# Patient Record
Sex: FEMALE | Race: OTHER | NOT HISPANIC OR LATINO | URBAN - METROPOLITAN AREA
[De-identification: names, ages, dates, MRNs, and addresses within clinical notes are randomized per-mention and may not be internally consistent; named-entity substitution may affect disease eponyms.]

---
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
## 2022-05-25 NOTE — PRE-OP CHECKLIST - TO WHOM
Pt amb to triage. EKG complete.  Chief Complaint   Patient presents with   • Palpitations   • Dizziness   • Generalized Body Aches   • Diarrhea     Pt reports heart palpitations x1mo, getting more frequent. Today had a longer episode and felt her left arm went numb. Resolved now. Pt goes on to report she has body aches all over, dry throat and diarrhea.    FIORDALIZA lowe

## 2023-01-04 NOTE — ASU PREOP CHECKLIST - STERILIZATION AFFIRMATION
n/a Clindamycin Counseling: I counseled the patient regarding use of clindamycin as an antibiotic for prophylactic and/or therapeutic purposes. Clindamycin is active against numerous classes of bacteria, including skin bacteria. Side effects may include nausea, diarrhea, gastrointestinal upset, rash, hives, yeast infections, and in rare cases, colitis.

## 2023-03-13 NOTE — ED CLERICAL - NS ED CLERK UNITS
APER Dutasteride Pregnancy And Lactation Text: This medication is absolutely contraindicated in women, especially during pregnancy and breast feeding. Feminization of male fetuses is possible if taking while pregnant.

## 2023-03-23 NOTE — ED CDU PROVIDER INITIAL DAY NOTE - CPE EDP NEURO NORM
[FreeTextEntry1] : Pt comes in follow up LUTS, ED, prostate nodule, and elevated PSA. Feels flomax has given him some improvement. \par \par 10/15/22 PSA 4.58\par 3/8/23 PSA 5.9 %free 12\par 2/13/23 MRI shows PIRADS 2 volume 50 PSA density 0.09
normal...

## 2023-08-24 NOTE — H&P ADULT - DOES THIS PATIENT HAVE A HISTORY OF OR HAS BEEN DX WITH HEART FAILURE?
Albert Riojas,  This patient needs to be scheduled as follows:  Diagnosis: BPH, incomplete bladder emptying  Procedure: Cystoscopy, UroLift  Time: Half hour  Site: [facility] outpatient surgery center  Anesthesia: General endotracheal  Antibiotics on-call to the operating room: Rocephin 1 g IV on-call. Preoperative labs: CBC, BMP, EKG, urine culture, PSA    He would need to be off his low-dose aspirin at least 5 days prior to surgery. Please call the patient to schedule. no no

## 2023-10-11 NOTE — ED PROVIDER NOTE - GASTROINTESTINAL [+], MLM

## 2023-10-22 ENCOUNTER — EMERGENCY (EMERGENCY)
Facility: HOSPITAL | Age: 23
LOS: 1 days | Discharge: HOME | End: 2023-10-22
Attending: EMERGENCY MEDICINE
Payer: COMMERCIAL

## 2023-10-22 VITALS
HEART RATE: 58 BPM | RESPIRATION RATE: 18 BRPM | OXYGEN SATURATION: 98 % | SYSTOLIC BLOOD PRESSURE: 102 MMHG | DIASTOLIC BLOOD PRESSURE: 94 MMHG

## 2023-10-22 VITALS
HEART RATE: 81 BPM | SYSTOLIC BLOOD PRESSURE: 116 MMHG | WEIGHT: 160.06 LBS | HEIGHT: 68 IN | RESPIRATION RATE: 18 BRPM | TEMPERATURE: 98 F | DIASTOLIC BLOOD PRESSURE: 76 MMHG | OXYGEN SATURATION: 97 %

## 2023-10-22 DIAGNOSIS — Z98.890 OTHER SPECIFIED POSTPROCEDURAL STATES: Chronic | ICD-10-CM

## 2023-10-22 DIAGNOSIS — Z90.49 ACQUIRED ABSENCE OF OTHER SPECIFIED PARTS OF DIGESTIVE TRACT: Chronic | ICD-10-CM

## 2023-10-22 LAB
ALBUMIN SERPL ELPH-MCNC: 4.3 G/DL — SIGNIFICANT CHANGE UP (ref 3.3–5)
ALBUMIN SERPL ELPH-MCNC: 4.3 G/DL — SIGNIFICANT CHANGE UP (ref 3.3–5)
ALP SERPL-CCNC: 87 U/L — SIGNIFICANT CHANGE UP (ref 40–120)
ALP SERPL-CCNC: 87 U/L — SIGNIFICANT CHANGE UP (ref 40–120)
ALT FLD-CCNC: 11 U/L — SIGNIFICANT CHANGE UP (ref 10–45)
ALT FLD-CCNC: 11 U/L — SIGNIFICANT CHANGE UP (ref 10–45)
ANION GAP SERPL CALC-SCNC: 13 MMOL/L — SIGNIFICANT CHANGE UP (ref 5–17)
ANION GAP SERPL CALC-SCNC: 13 MMOL/L — SIGNIFICANT CHANGE UP (ref 5–17)
APPEARANCE UR: CLEAR — SIGNIFICANT CHANGE UP
APPEARANCE UR: CLEAR — SIGNIFICANT CHANGE UP
AST SERPL-CCNC: 11 U/L — SIGNIFICANT CHANGE UP (ref 10–40)
AST SERPL-CCNC: 11 U/L — SIGNIFICANT CHANGE UP (ref 10–40)
BACTERIA # UR AUTO: NEGATIVE — SIGNIFICANT CHANGE UP
BACTERIA # UR AUTO: NEGATIVE — SIGNIFICANT CHANGE UP
BASOPHILS # BLD AUTO: 0.02 K/UL — SIGNIFICANT CHANGE UP (ref 0–0.2)
BASOPHILS # BLD AUTO: 0.02 K/UL — SIGNIFICANT CHANGE UP (ref 0–0.2)
BASOPHILS NFR BLD AUTO: 0.3 % — SIGNIFICANT CHANGE UP (ref 0–2)
BASOPHILS NFR BLD AUTO: 0.3 % — SIGNIFICANT CHANGE UP (ref 0–2)
BILIRUB SERPL-MCNC: 0.5 MG/DL — SIGNIFICANT CHANGE UP (ref 0.2–1.2)
BILIRUB SERPL-MCNC: 0.5 MG/DL — SIGNIFICANT CHANGE UP (ref 0.2–1.2)
BILIRUB UR-MCNC: NEGATIVE — SIGNIFICANT CHANGE UP
BILIRUB UR-MCNC: NEGATIVE — SIGNIFICANT CHANGE UP
BLD GP AB SCN SERPL QL: NEGATIVE — SIGNIFICANT CHANGE UP
BLD GP AB SCN SERPL QL: NEGATIVE — SIGNIFICANT CHANGE UP
BUN SERPL-MCNC: 10 MG/DL — SIGNIFICANT CHANGE UP (ref 7–23)
BUN SERPL-MCNC: 10 MG/DL — SIGNIFICANT CHANGE UP (ref 7–23)
CALCIUM SERPL-MCNC: 9.8 MG/DL — SIGNIFICANT CHANGE UP (ref 8.4–10.5)
CALCIUM SERPL-MCNC: 9.8 MG/DL — SIGNIFICANT CHANGE UP (ref 8.4–10.5)
CHLORIDE SERPL-SCNC: 103 MMOL/L — SIGNIFICANT CHANGE UP (ref 96–108)
CHLORIDE SERPL-SCNC: 103 MMOL/L — SIGNIFICANT CHANGE UP (ref 96–108)
CO2 SERPL-SCNC: 23 MMOL/L — SIGNIFICANT CHANGE UP (ref 22–31)
CO2 SERPL-SCNC: 23 MMOL/L — SIGNIFICANT CHANGE UP (ref 22–31)
COLOR SPEC: YELLOW — SIGNIFICANT CHANGE UP
COLOR SPEC: YELLOW — SIGNIFICANT CHANGE UP
CREAT SERPL-MCNC: 0.66 MG/DL — SIGNIFICANT CHANGE UP (ref 0.5–1.3)
CREAT SERPL-MCNC: 0.66 MG/DL — SIGNIFICANT CHANGE UP (ref 0.5–1.3)
DIFF PNL FLD: ABNORMAL
DIFF PNL FLD: ABNORMAL
EGFR: 128 ML/MIN/1.73M2 — SIGNIFICANT CHANGE UP
EGFR: 128 ML/MIN/1.73M2 — SIGNIFICANT CHANGE UP
EOSINOPHIL # BLD AUTO: 0.08 K/UL — SIGNIFICANT CHANGE UP (ref 0–0.5)
EOSINOPHIL # BLD AUTO: 0.08 K/UL — SIGNIFICANT CHANGE UP (ref 0–0.5)
EOSINOPHIL NFR BLD AUTO: 1.3 % — SIGNIFICANT CHANGE UP (ref 0–6)
EOSINOPHIL NFR BLD AUTO: 1.3 % — SIGNIFICANT CHANGE UP (ref 0–6)
EPI CELLS # UR: 4 /HPF — SIGNIFICANT CHANGE UP
EPI CELLS # UR: 4 /HPF — SIGNIFICANT CHANGE UP
GLUCOSE SERPL-MCNC: 84 MG/DL — SIGNIFICANT CHANGE UP (ref 70–99)
GLUCOSE SERPL-MCNC: 84 MG/DL — SIGNIFICANT CHANGE UP (ref 70–99)
GLUCOSE UR QL: NEGATIVE — SIGNIFICANT CHANGE UP
GLUCOSE UR QL: NEGATIVE — SIGNIFICANT CHANGE UP
HCG SERPL-ACNC: <2 MIU/ML — SIGNIFICANT CHANGE UP
HCG SERPL-ACNC: <2 MIU/ML — SIGNIFICANT CHANGE UP
HCT VFR BLD CALC: 38.8 % — SIGNIFICANT CHANGE UP (ref 34.5–45)
HCT VFR BLD CALC: 38.8 % — SIGNIFICANT CHANGE UP (ref 34.5–45)
HGB BLD-MCNC: 12.5 G/DL — SIGNIFICANT CHANGE UP (ref 11.5–15.5)
HGB BLD-MCNC: 12.5 G/DL — SIGNIFICANT CHANGE UP (ref 11.5–15.5)
HYALINE CASTS # UR AUTO: 2 /LPF — SIGNIFICANT CHANGE UP (ref 0–2)
HYALINE CASTS # UR AUTO: 2 /LPF — SIGNIFICANT CHANGE UP (ref 0–2)
IMM GRANULOCYTES NFR BLD AUTO: 0.2 % — SIGNIFICANT CHANGE UP (ref 0–0.9)
IMM GRANULOCYTES NFR BLD AUTO: 0.2 % — SIGNIFICANT CHANGE UP (ref 0–0.9)
KETONES UR-MCNC: NEGATIVE — SIGNIFICANT CHANGE UP
KETONES UR-MCNC: NEGATIVE — SIGNIFICANT CHANGE UP
LEUKOCYTE ESTERASE UR-ACNC: NEGATIVE — SIGNIFICANT CHANGE UP
LEUKOCYTE ESTERASE UR-ACNC: NEGATIVE — SIGNIFICANT CHANGE UP
LYMPHOCYTES # BLD AUTO: 1.86 K/UL — SIGNIFICANT CHANGE UP (ref 1–3.3)
LYMPHOCYTES # BLD AUTO: 1.86 K/UL — SIGNIFICANT CHANGE UP (ref 1–3.3)
LYMPHOCYTES # BLD AUTO: 30.3 % — SIGNIFICANT CHANGE UP (ref 13–44)
LYMPHOCYTES # BLD AUTO: 30.3 % — SIGNIFICANT CHANGE UP (ref 13–44)
MCHC RBC-ENTMCNC: 28.7 PG — SIGNIFICANT CHANGE UP (ref 27–34)
MCHC RBC-ENTMCNC: 28.7 PG — SIGNIFICANT CHANGE UP (ref 27–34)
MCHC RBC-ENTMCNC: 32.2 GM/DL — SIGNIFICANT CHANGE UP (ref 32–36)
MCHC RBC-ENTMCNC: 32.2 GM/DL — SIGNIFICANT CHANGE UP (ref 32–36)
MCV RBC AUTO: 89 FL — SIGNIFICANT CHANGE UP (ref 80–100)
MCV RBC AUTO: 89 FL — SIGNIFICANT CHANGE UP (ref 80–100)
MONOCYTES # BLD AUTO: 0.38 K/UL — SIGNIFICANT CHANGE UP (ref 0–0.9)
MONOCYTES # BLD AUTO: 0.38 K/UL — SIGNIFICANT CHANGE UP (ref 0–0.9)
MONOCYTES NFR BLD AUTO: 6.2 % — SIGNIFICANT CHANGE UP (ref 2–14)
MONOCYTES NFR BLD AUTO: 6.2 % — SIGNIFICANT CHANGE UP (ref 2–14)
NEUTROPHILS # BLD AUTO: 3.79 K/UL — SIGNIFICANT CHANGE UP (ref 1.8–7.4)
NEUTROPHILS # BLD AUTO: 3.79 K/UL — SIGNIFICANT CHANGE UP (ref 1.8–7.4)
NEUTROPHILS NFR BLD AUTO: 61.7 % — SIGNIFICANT CHANGE UP (ref 43–77)
NEUTROPHILS NFR BLD AUTO: 61.7 % — SIGNIFICANT CHANGE UP (ref 43–77)
NITRITE UR-MCNC: NEGATIVE — SIGNIFICANT CHANGE UP
NITRITE UR-MCNC: NEGATIVE — SIGNIFICANT CHANGE UP
NRBC # BLD: 0 /100 WBCS — SIGNIFICANT CHANGE UP (ref 0–0)
NRBC # BLD: 0 /100 WBCS — SIGNIFICANT CHANGE UP (ref 0–0)
PH UR: 7 — SIGNIFICANT CHANGE UP (ref 5–8)
PH UR: 7 — SIGNIFICANT CHANGE UP (ref 5–8)
PLATELET # BLD AUTO: 170 K/UL — SIGNIFICANT CHANGE UP (ref 150–400)
PLATELET # BLD AUTO: 170 K/UL — SIGNIFICANT CHANGE UP (ref 150–400)
POTASSIUM SERPL-MCNC: 4.2 MMOL/L — SIGNIFICANT CHANGE UP (ref 3.5–5.3)
POTASSIUM SERPL-MCNC: 4.2 MMOL/L — SIGNIFICANT CHANGE UP (ref 3.5–5.3)
POTASSIUM SERPL-SCNC: 4.2 MMOL/L — SIGNIFICANT CHANGE UP (ref 3.5–5.3)
POTASSIUM SERPL-SCNC: 4.2 MMOL/L — SIGNIFICANT CHANGE UP (ref 3.5–5.3)
PROT SERPL-MCNC: 7.6 G/DL — SIGNIFICANT CHANGE UP (ref 6–8.3)
PROT SERPL-MCNC: 7.6 G/DL — SIGNIFICANT CHANGE UP (ref 6–8.3)
PROT UR-MCNC: SIGNIFICANT CHANGE UP
PROT UR-MCNC: SIGNIFICANT CHANGE UP
RBC # BLD: 4.36 M/UL — SIGNIFICANT CHANGE UP (ref 3.8–5.2)
RBC # BLD: 4.36 M/UL — SIGNIFICANT CHANGE UP (ref 3.8–5.2)
RBC # FLD: 11.7 % — SIGNIFICANT CHANGE UP (ref 10.3–14.5)
RBC # FLD: 11.7 % — SIGNIFICANT CHANGE UP (ref 10.3–14.5)
RBC CASTS # UR COMP ASSIST: 1 /HPF — SIGNIFICANT CHANGE UP (ref 0–4)
RBC CASTS # UR COMP ASSIST: 1 /HPF — SIGNIFICANT CHANGE UP (ref 0–4)
RH IG SCN BLD-IMP: POSITIVE — SIGNIFICANT CHANGE UP
RH IG SCN BLD-IMP: POSITIVE — SIGNIFICANT CHANGE UP
SODIUM SERPL-SCNC: 139 MMOL/L — SIGNIFICANT CHANGE UP (ref 135–145)
SODIUM SERPL-SCNC: 139 MMOL/L — SIGNIFICANT CHANGE UP (ref 135–145)
SP GR SPEC: 1.03 — HIGH (ref 1.01–1.02)
SP GR SPEC: 1.03 — HIGH (ref 1.01–1.02)
UROBILINOGEN FLD QL: NEGATIVE — SIGNIFICANT CHANGE UP
UROBILINOGEN FLD QL: NEGATIVE — SIGNIFICANT CHANGE UP
WBC # BLD: 6.14 K/UL — SIGNIFICANT CHANGE UP (ref 3.8–10.5)
WBC # BLD: 6.14 K/UL — SIGNIFICANT CHANGE UP (ref 3.8–10.5)
WBC # FLD AUTO: 6.14 K/UL — SIGNIFICANT CHANGE UP (ref 3.8–10.5)
WBC # FLD AUTO: 6.14 K/UL — SIGNIFICANT CHANGE UP (ref 3.8–10.5)
WBC UR QL: 1 /HPF — SIGNIFICANT CHANGE UP (ref 0–5)
WBC UR QL: 1 /HPF — SIGNIFICANT CHANGE UP (ref 0–5)

## 2023-10-22 PROCEDURE — 86900 BLOOD TYPING SEROLOGIC ABO: CPT

## 2023-10-22 PROCEDURE — 86901 BLOOD TYPING SEROLOGIC RH(D): CPT

## 2023-10-22 PROCEDURE — 85025 COMPLETE CBC W/AUTO DIFF WBC: CPT

## 2023-10-22 PROCEDURE — 76830 TRANSVAGINAL US NON-OB: CPT | Mod: 26

## 2023-10-22 PROCEDURE — 81001 URINALYSIS AUTO W/SCOPE: CPT

## 2023-10-22 PROCEDURE — 96375 TX/PRO/DX INJ NEW DRUG ADDON: CPT

## 2023-10-22 PROCEDURE — 87591 N.GONORRHOEAE DNA AMP PROB: CPT

## 2023-10-22 PROCEDURE — 99285 EMERGENCY DEPT VISIT HI MDM: CPT | Mod: 25

## 2023-10-22 PROCEDURE — 86850 RBC ANTIBODY SCREEN: CPT

## 2023-10-22 PROCEDURE — 87491 CHLMYD TRACH DNA AMP PROBE: CPT

## 2023-10-22 PROCEDURE — 76830 TRANSVAGINAL US NON-OB: CPT

## 2023-10-22 PROCEDURE — 93975 VASCULAR STUDY: CPT | Mod: 26

## 2023-10-22 PROCEDURE — 87086 URINE CULTURE/COLONY COUNT: CPT

## 2023-10-22 PROCEDURE — 99284 EMERGENCY DEPT VISIT MOD MDM: CPT

## 2023-10-22 PROCEDURE — 80053 COMPREHEN METABOLIC PANEL: CPT

## 2023-10-22 PROCEDURE — 93975 VASCULAR STUDY: CPT

## 2023-10-22 PROCEDURE — 84702 CHORIONIC GONADOTROPIN TEST: CPT

## 2023-10-22 PROCEDURE — 96374 THER/PROPH/DIAG INJ IV PUSH: CPT

## 2023-10-22 RX ORDER — KETOROLAC TROMETHAMINE 30 MG/ML
15 SYRINGE (ML) INJECTION ONCE
Refills: 0 | Status: DISCONTINUED | OUTPATIENT
Start: 2023-10-22 | End: 2023-10-22

## 2023-10-22 RX ORDER — ACETAMINOPHEN 500 MG
1000 TABLET ORAL ONCE
Refills: 0 | Status: COMPLETED | OUTPATIENT
Start: 2023-10-22 | End: 2023-10-22

## 2023-10-22 RX ADMIN — Medication 15 MILLIGRAM(S): at 16:13

## 2023-10-22 RX ADMIN — Medication 400 MILLIGRAM(S): at 12:39

## 2023-10-22 NOTE — ED PROVIDER NOTE - DATE/TIME 3
How Severe Is Your Cyst?: mild
Is This A New Presentation, Or A Follow-Up?: Cyst
Additional History: \\n\\nStarted out as small bump. Patient sits a lot for work. Hasn’t drained.
22-Oct-2023 17:24

## 2023-10-22 NOTE — ED PROVIDER NOTE - NSFOLLOWUPINSTRUCTIONS_ED_ALL_ED_FT
You have been evaluated in the Emergency Department today for abdominal pain and vaginal bleeding. Your evaluation did not show evidence of medical conditions requiring emergent intervention at this time. Your results are attached.    The Hospital will call you in a couple days to schedule an appointment with a Gynecologist.    Please show your ultrasound results to the Gynecologist and tell them about the lump in your groin.    For pain, you can take TYLENOL/ACETAMINOPHEN up to 4,000mg a day for your symptoms in four divided doses and MOTRIN/IBUPROFEN up to 2,400mg a day in four divided doses (for up to 5 days with food).    Return to the Emergency Department if you experience worsening or uncontrolled pain, vaginal bleeding soaking through greater than 2 pads per hour for greater than 2 hours, or any other concerning symptoms.    Thank you for choosing us for your care.

## 2023-10-22 NOTE — ED ADULT NURSE NOTE - NSFALLUNIVINTERV_ED_ALL_ED
Bed/Stretcher in lowest position, wheels locked, appropriate side rails in place/Call bell, personal items and telephone in reach/Instruct patient to call for assistance before getting out of bed/chair/stretcher/Non-slip footwear applied when patient is off stretcher/[place] to call system/Physically safe environment - no spills, clutter or unnecessary equipment/Purposeful proactive rounding/Room/bathroom lighting operational, light cord in reach

## 2023-10-22 NOTE — ED ADULT TRIAGE NOTE - CHIEF COMPLAINT QUOTE
heavy bleeding/cramping x 1 month, post had knee surgery on 9/28, pt states that she has had period on/off since surgery.

## 2023-10-22 NOTE — ED ADULT NURSE NOTE - OBJECTIVE STATEMENT
1105 23 yr old female c/o vaginal bleeding  and abd cramps x 2 wks. Hx of R knee surg 09/28/2023 LMP 2 wks before that. A&Ox4. Ambulatory. Color pink. Skin W&D. Abd soft. Denies dizziness or weakness

## 2023-10-22 NOTE — ED PROVIDER NOTE - CLINICAL SUMMARY MEDICAL DECISION MAKING FREE TEXT BOX
Intermittent vaginal bleeding associated with crampy pelvic pain.  Pain is unilateral on the right side.  There is a concern for ovarian torsion however this is less likely.  We will get a pelvic ultrasound to evaluate for ovarian torsion or other emergent cause of patient's symptoms.  We will get an hCG level.  Appendicitis is less likely as patient does not have any GI symptoms and is not exquisitely tender at McBurney's point.  Will reassess.

## 2023-10-22 NOTE — ED PROVIDER NOTE - OBJECTIVE STATEMENT
Attendin-year-old female presents with intermittent vaginal bleeding.  Patient had an orthopedic surgery earlier this month.  Since then she got her period 2 weeks early..  This was approximately 2 weeks ago.  Now patient is on her period again.  Patient states that her periods are usually not irregular like this and this is very unusual for her.  She is also having intermittent lower abdominal pain and cramping.  No fevers or chills.  She is tolerating p.o. well.  This intermittent pain and cramping has been ongoing since 2 to 3 weeks.

## 2023-10-22 NOTE — ED PROVIDER NOTE - PROGRESS NOTE DETAILS
Corinne Renner M.D. (Resident Physician): US shows a left endometrioma vs hemorrhagic cyst. Will give Gyn f/u. Corinne Renner M.D. (Resident Physician): Pelvic exam with PA student. Normal external genitalia. Nodule palpated lateral to the left labia. Scant blood in the vaginal vault. Cervic not visualized. Co CMT or adnexal ttp. Corinne Renner M.D. (Resident Physician): UA negative. Agapito hollins

## 2023-10-22 NOTE — ED PROVIDER NOTE - TEMPLATE, MLM
Progress Notes by Karon De Jesus PT at 09/07/17 05:05 PM     Author:  Karon De Jesus PT Service:  (none) Author Type:  Physical Therapist     Filed:  09/07/17 07:10 PM Encounter Date:  9/7/2017 Status:  Signed     :  Karon De Jesus PT (Physical Therapist)              PHYSICAL THERAPY EVALUATION    DIAGNOSIS:[DW1.1T] Left knee OA[DW1.1M]    INSURANCE BENEFITS:[DW1.1T] Patient has 60 visits in benefit as of January (0 used)[DW1.2M]    PHYSICIAN RECOMMENDATIONS: Evaluate and Treat     ATTENDANCE: Patient has been seen for 1 visits between 9/7/2017 and  9/7/2017.  Progress Summary due by[DW1.1T] 10/7/2017[DW1.2M].    SUBJECTIVE:[DW1.1T]Patient complains of anterior knee pain. No injury. Worse with sitting, getting up in the morning, stairs - one at a time. Works as a  on feet a lot. Throbs at end of work day.  No prior PT.[DW1.1M]      Onset date[DW1.1T] Several years[DW1.1M]  · Average pain:[DW1.1T] 5[DW1.1M]/10    Mechanism of injury/surgery:[DW1.1T] unknown[DW1.2M]    Pertinent medical history: see medical history in chart   o Co-morbidities significant to therapy include[DW1.1T] HTN[DW1.2M]   · Social History:[DW1.1T] with spouse/[DW1.2M]    Pertinent Meds:[DW1.1T] tylenol[DW1.1M]    X-ray in [facility][DW1.2M]    OBJECTIVE:     Observation/Posture:[DW1.1T] level pelvis[DW1.1M], lateral patella tilt[DW1.2M] bilateral[DW1.2T]     Range of Motion:   Knee range of motion (*=pain):   Right  9/7/2017  Left  9/7/2017    Active Flexion[DW1.1T] 0[DW1.1M] to[DW1.1T] 130 -10[DW1.1M] to[DW1.1T] 130[DW1.1M]       Manual Muscle Test:   Strength per manual muscle test (*=pain):   Right  9/7/2017  Left  9/7/2017    Quadriceps[DW1.1T] 5/5 5/5[DW1.1M]   Hamstrings[DW1.1T] 5/5 5/5   Hip ab[DW1.1M]duction 5/5 4/5[DW1.2M]   Hip[DW1.1M] External rotation[DW1.2T]  5/5 4+/5   Hip[DW1.1M] Extension 5/5 4/5[DW1.2M]       Palpation:[DW1.1T]increased tissue texture:[DW1.2M]Left adductor  insertion[DW1.1M],[DW1.2M] Left quads[DW1.1M], left hamstrings[DW1.2M]    Joint Mobility:[DW1.1T]normal patellar glides[DW1.2M]    Special Tests:[DW1.1T]   - varus/valgus stress test  -compression/distraction for meniscus/ligament instability  +6\" step down test with pain and poor control   Prone quad flexibility right 115 left 105[DW1.2M]    Functional Assessment:   Gait:[DW1.1T] decreased stance on left LE, decreased knee extension during late stance[DW1.2M]  Stairs:[DW1.1T] normal but pain descending[DW1.2M]  Functional Squats:[DW1.1T] 1/2 range anterior knee pain[DW1.1M]  SLS Right 10 seconds mod sway Left 8 seconds moderate sway[DW1.2M]    TREATMENT TODAY:   Time in:[DW1.1T] 5:06[DW1.2M]     Time out:[DW1.1T]  5:45[DW1.2M]    Physical Therapy Evaluation[DW1.1T]  98466 - PHYSICAL THERAPY EVALUATION MODERATE COMPLEXITY 30 MIN[DW1.2M]  Evaluation, education of findings, and plan of care.  The patient demonstrated an understanding.  See Assessment for findings.   Face to face evaluation time with the patient[DW1.1T] 30[DW1.2M] .      Manual Therapy to[DW1.1T] decrease myofascial tightness[DW1.2M]:  46463 x[DW1.1T] 0[DW1.2M] units -[DW1.1T]  0[DW1.2M] minutes[DW1.1T]  Next  Release to quads, adductors, hamstrings[DW1.2M]    Therapeutic Exercise to[DW1.1T] increase range of motion, improve flexibility, increase strength and instruct in a home exercise program[DW1.2M]:  45839 x[DW1.1T] 1[DW1.2M] units -[DW1.1T]  9[DW1.2M] minutes[DW1.1T]  Bridging  Clam shell  ITB stretch standing  Mod down dog  Prone quad stretch with strap  Lateral eccentric step down  Next  Bike x 5  Calf stretch with quad set orange band  Prone quad sets  Long sitting propped on stool suspended TKE 3# 3 min  Side hip abduction  Side stepping orange band  Monster walks orange band[DW1.2M]          Home exercise program (last updated (9/7/2017): Patient issued written home exercise program.  Patient demonstrated exercises correctly and was  instructed to call with questions.[DW1.1T]   Bridging  Clam shell  ITB stretch standing  Mod down dog  Prone quad stretch with strap  Lateral eccentric step down[DW1.2M]      ASSESSMENT/TREATMENT RESPONSE:  Patient's signs and symptoms are consistent with[DW1.1T] knee[DW1.2M] O[DW1.2T]A[DW1.2M].  Findings of the evaluation include[DW1.1T] lateral patellar tilt, myofascial restrictions, hip weakness, painful squatting[DW1.2M].  See co-morbidities above that may impact therapy.  See subjective for patient’s self reported limitations with functional activity. Treatment will address[DW1.1T] deficits noted above[DW1.2M].     A clinical presentation with:[DW1.1T] stable and/or uncomplicated charactaristics[DW1.2M]    Patient's response to treatment:[DW1.1T] Better understanding of disease/injury[DW1.2M]    Functional improvement noted: Patient demonstrated understanding of home exercise program.    Prognosis for meeting goals:[DW1.1T]  good[DW1.2M].   Treatment will consist of[DW1.1T] home program, manual therapy, neuromuscular re-education and therapeutic exercise[DW1.2M].  Treatment plan was discussed with[DW1.1T] the patient[DW1.2M] and verbal consent was obtained.    Remaining Impairment Requiring Continued Treatment:[DW1.1T] decreased flexibility, decreased strength, decreased balance, pain and impairment of functional performance[DW1.2M]    Short Term Goals (to be achieved in[DW1.1T] 3[DW1.2M] weeks)[DW1.1T]  1. Patient will increase knee extension to 0 to facilitate gait mechanics  2. Patient will increase single leg balance to 10 seconds to improve ability to step over obstacles   3. Patient will perform 10 squats half range with proper mechanics without increased pain  4. Patient will lower self for 4\" step with proper mechanics 10x without increased pain[DW1.2M]    Long Term Goals (to be achieved in[DW1.1T] 6[DW1.2M] weeks)[DW1.1T]  1. Patient will lower self for 6\" step 10x without increased pain.  2. Patient  will report pain reduced to 3/10 at end of work day.  3. Patient will be independent in home program and progression without increased symptoms.[DW1.2M]    PLAN:   Patient will be seen[DW1.1T] 2[DW1.2M] times per week for[DW1.1T] 6[DW1.2M] weeks.     Therapist Signature: Electronically Signed by:    Karon De Jesus, PT , 9/7/2017[DW1.1T]        Revision History        User Key Date/Time User Provider Type Action    > DW1.2 09/07/17 07:10 PM Karon De Jesus, PT Physical Therapist Sign     DW1.1 09/07/17 05:05 PM Karon De Jesus, PT Physical Therapist     M - Manual, T - Template             Abdominal Pain, N/V/D

## 2023-10-23 LAB
C TRACH RRNA SPEC QL NAA+PROBE: SIGNIFICANT CHANGE UP
C TRACH RRNA SPEC QL NAA+PROBE: SIGNIFICANT CHANGE UP
N GONORRHOEA RRNA SPEC QL NAA+PROBE: SIGNIFICANT CHANGE UP
N GONORRHOEA RRNA SPEC QL NAA+PROBE: SIGNIFICANT CHANGE UP
SPECIMEN SOURCE: SIGNIFICANT CHANGE UP
SPECIMEN SOURCE: SIGNIFICANT CHANGE UP

## 2024-04-16 NOTE — ED PROVIDER NOTE - ATTENDING CONTRIBUTION TO CARE
Schedule colonoscopy with biopsies to assess severity and status of her UC, further recommendations to follow.     Check stool studies and blood work     Avoid all NSAIDs     Refill Lialda 4.8 gm later, may need to consider escalation of therapy to biologic therapy if ongoing inflammation despite Lialda.     Bentyl 20mg prior to meals and bed  
20 year old female with history of cholelithiasis s/p lap nestor POD#2 presented to ED with diffuse abdominal pain associated with nausea. States she has had the abdominal pain since discharge from last admission. Pain constant, dull, worse with respirations. States he has not take her prescribed dose of  oxycodone because of anxiety. No fever, chills, vomiting. On exam: Abdominal surgical wound clean and dry, no surrounding erythema. +tenderness in all 4 quadrants, abdomen soft, non-distended. Impression: Likely post surgical pain with inadequate analgesia, r/o retained stone, pancreatitis. Plan: Labs, CT/ap, reassess
2.04

## 2024-06-19 ENCOUNTER — EMERGENCY (EMERGENCY)
Facility: HOSPITAL | Age: 24
LOS: 1 days | End: 2024-06-19
Admitting: EMERGENCY MEDICINE
Payer: COMMERCIAL

## 2024-06-19 VITALS
HEART RATE: 92 BPM | WEIGHT: 164.91 LBS | DIASTOLIC BLOOD PRESSURE: 67 MMHG | OXYGEN SATURATION: 98 % | HEIGHT: 67 IN | SYSTOLIC BLOOD PRESSURE: 97 MMHG | RESPIRATION RATE: 18 BRPM | TEMPERATURE: 98 F

## 2024-06-19 DIAGNOSIS — Z98.890 OTHER SPECIFIED POSTPROCEDURAL STATES: Chronic | ICD-10-CM

## 2024-06-19 DIAGNOSIS — Z90.49 ACQUIRED ABSENCE OF OTHER SPECIFIED PARTS OF DIGESTIVE TRACT: Chronic | ICD-10-CM

## 2024-06-19 PROCEDURE — L9991: CPT

## 2024-06-19 NOTE — ED ADULT TRIAGE NOTE - AS PAIN REST
Pt saw the surgeon and Pt was told to call our office for another injection   Does PT need to come in for an appointment first? 6 (moderate pain)

## 2025-04-07 NOTE — ED ADULT TRIAGE NOTE - ISOLATION TYPE:
SUBJECTIVE/ OVERNIGHT EVENTS:  pain is better  the daughter at bedside.   rehab planning in progress  no cp, no sob, no n/v/d. no abdominal pain.  no headache, no dizziness.     --------------------------------------------------------------------------------------------  LABS:            CAPILLARY BLOOD GLUCOSE      POCT Blood Glucose.: 118 mg/dL (07 Apr 2025 11:22)  POCT Blood Glucose.: 117 mg/dL (07 Apr 2025 07:25)  POCT Blood Glucose.: 97 mg/dL (06 Apr 2025 21:47)  POCT Blood Glucose.: 112 mg/dL (06 Apr 2025 16:39)            RADIOLOGY & ADDITIONAL TESTS:    Imaging Personally Reviewed:  [x] YES  [ ] NO    Consultant(s) Notes Reviewed:  [x] YES  [ ] NO    MEDICATIONS  (STANDING):  amLODIPine   Tablet 5 milliGRAM(s) Oral daily  atorvastatin 20 milliGRAM(s) Oral at bedtime  dextrose 5%. 1000 milliLiter(s) (100 mL/Hr) IV Continuous <Continuous>  dextrose 5%. 1000 milliLiter(s) (50 mL/Hr) IV Continuous <Continuous>  dextrose 50% Injectable 25 Gram(s) IV Push once  dextrose 50% Injectable 12.5 Gram(s) IV Push once  dextrose 50% Injectable 25 Gram(s) IV Push once  diazepam    Tablet 5 milliGRAM(s) Oral every 8 hours  gabapentin Solution 300 milliGRAM(s) Oral three times a day  glucagon  Injectable 1 milliGRAM(s) IntraMuscular once  insulin lispro (ADMELOG) corrective regimen sliding scale   SubCutaneous three times a day before meals  insulin lispro (ADMELOG) corrective regimen sliding scale   SubCutaneous at bedtime  pantoprazole    Tablet 40 milliGRAM(s) Oral before breakfast  polyethylene glycol 3350 17 Gram(s) Oral two times a day  senna 2 Tablet(s) Oral at bedtime  sodium chloride 0.9%. 1000 milliLiter(s) (100 mL/Hr) IV Continuous <Continuous>    MEDICATIONS  (PRN):  dextrose Oral Gel 15 Gram(s) Oral once PRN Blood Glucose LESS THAN 70 milliGRAM(s)/deciliter  HYDROmorphone   Solution 2 milliGRAM(s) Oral every 3 hours PRN Moderate Pain (4 - 6)  HYDROmorphone   Solution 4 milliGRAM(s) Oral every 3 hours PRN Severe Pain (7 - 10)  naloxone Injectable 0.1 milliGRAM(s) IV Push every 3 minutes PRN For ANY of the following changes in patient status:  A. RR LESS THAN 10 breaths per minute, B. Oxygen saturation LESS THAN 90%, C. Sedation score of 6  ondansetron Injectable 4 milliGRAM(s) IV Push every 6 hours PRN Nausea      Care Discussed with Consultants/Other Providers [x] YES  [ ] NO    Vital Signs Last 24 Hrs  T(C): 36.7 (07 Apr 2025 12:57), Max: 37.4 (07 Apr 2025 02:03)  T(F): 98 (07 Apr 2025 12:57), Max: 99.3 (07 Apr 2025 02:03)  HR: 107 (07 Apr 2025 12:57) (98 - 109)  BP: 130/74 (07 Apr 2025 12:57) (101/67 - 138/85)  BP(mean): --  RR: 18 (07 Apr 2025 12:57) (17 - 18)  SpO2: 96% (07 Apr 2025 12:57) (94% - 99%)    Parameters below as of 07 Apr 2025 12:57  Patient On (Oxygen Delivery Method): room air      I&O's Summary    06 Apr 2025 07:01  -  07 Apr 2025 07:00  --------------------------------------------------------  IN: 0 mL / OUT: 700 mL / NET: -700 mL        PHYSICAL EXAM:  GENERAL: NAD, well-developed, comfortable on room air  HEAD:  Atraumatic, Normocephalic  EYES: EOMI, PERRLA, conjunctiva and sclera clear  NECK: Supple, No JVD, dressing d/c/i, neck collar in place.   CHEST/LUNG: Clear to auscultation bilaterally; No wheeze  HEART: Regular rate and rhythm; No murmurs, rubs, or gallops  ABDOMEN: Soft, Nontender, Nondistended; Bowel sounds present  Neuro: AAOx3, no focal weakness, 5/5 b/l extremity strength  EXTREMITIES:  2+ Peripheral Pulses, No clubbing, cyanosis, or edema  SKIN: No rashes or lesions    None

## 2025-07-08 ENCOUNTER — APPOINTMENT (OUTPATIENT)
Dept: NEUROLOGY | Facility: CLINIC | Age: 25
End: 2025-07-08

## 2025-07-08 VITALS
HEART RATE: 85 BPM | BODY MASS INDEX: 21.97 KG/M2 | DIASTOLIC BLOOD PRESSURE: 66 MMHG | WEIGHT: 140 LBS | HEIGHT: 67 IN | SYSTOLIC BLOOD PRESSURE: 111 MMHG

## 2025-07-24 ENCOUNTER — EMERGENCY (EMERGENCY)
Facility: HOSPITAL | Age: 25
LOS: 1 days | End: 2025-07-24
Attending: PERSONAL EMERGENCY RESPONSE ATTENDANT
Payer: COMMERCIAL

## 2025-07-24 VITALS
WEIGHT: 160.06 LBS | RESPIRATION RATE: 20 BRPM | HEIGHT: 68 IN | OXYGEN SATURATION: 100 % | SYSTOLIC BLOOD PRESSURE: 113 MMHG | DIASTOLIC BLOOD PRESSURE: 65 MMHG | HEART RATE: 101 BPM | TEMPERATURE: 98 F

## 2025-07-24 DIAGNOSIS — Z98.890 OTHER SPECIFIED POSTPROCEDURAL STATES: Chronic | ICD-10-CM

## 2025-07-24 DIAGNOSIS — Z90.49 ACQUIRED ABSENCE OF OTHER SPECIFIED PARTS OF DIGESTIVE TRACT: Chronic | ICD-10-CM

## 2025-07-24 LAB
ALBUMIN SERPL ELPH-MCNC: 4.5 G/DL — SIGNIFICANT CHANGE UP (ref 3.3–5)
ALP SERPL-CCNC: 64 U/L — SIGNIFICANT CHANGE UP (ref 40–120)
ALT FLD-CCNC: 33 U/L — SIGNIFICANT CHANGE UP (ref 10–45)
ANION GAP SERPL CALC-SCNC: 14 MMOL/L — SIGNIFICANT CHANGE UP (ref 5–17)
AST SERPL-CCNC: 19 U/L — SIGNIFICANT CHANGE UP (ref 10–40)
BASOPHILS # BLD AUTO: 0.02 K/UL — SIGNIFICANT CHANGE UP (ref 0–0.2)
BASOPHILS NFR BLD AUTO: 0.3 % — SIGNIFICANT CHANGE UP (ref 0–2)
BILIRUB SERPL-MCNC: 0.5 MG/DL — SIGNIFICANT CHANGE UP (ref 0.2–1.2)
BUN SERPL-MCNC: 12 MG/DL — SIGNIFICANT CHANGE UP (ref 7–23)
CALCIUM SERPL-MCNC: 9.6 MG/DL — SIGNIFICANT CHANGE UP (ref 8.4–10.5)
CHLORIDE SERPL-SCNC: 102 MMOL/L — SIGNIFICANT CHANGE UP (ref 96–108)
CO2 SERPL-SCNC: 21 MMOL/L — LOW (ref 22–31)
CREAT SERPL-MCNC: 0.73 MG/DL — SIGNIFICANT CHANGE UP (ref 0.5–1.3)
D DIMER BLD IA.RAPID-MCNC: 157 NG/ML DDU — SIGNIFICANT CHANGE UP
EGFR: 118 ML/MIN/1.73M2 — SIGNIFICANT CHANGE UP
EGFR: 118 ML/MIN/1.73M2 — SIGNIFICANT CHANGE UP
EOSINOPHIL # BLD AUTO: 0.06 K/UL — SIGNIFICANT CHANGE UP (ref 0–0.5)
EOSINOPHIL NFR BLD AUTO: 0.8 % — SIGNIFICANT CHANGE UP (ref 0–6)
GLUCOSE SERPL-MCNC: 123 MG/DL — HIGH (ref 70–99)
HCG SERPL-ACNC: <2 MIU/ML — SIGNIFICANT CHANGE UP
HCT VFR BLD CALC: 33.5 % — LOW (ref 34.5–45)
HGB BLD-MCNC: 11.2 G/DL — LOW (ref 11.5–15.5)
IMM GRANULOCYTES # BLD AUTO: 0.01 K/UL — SIGNIFICANT CHANGE UP (ref 0–0.07)
IMM GRANULOCYTES NFR BLD AUTO: 0.1 % — SIGNIFICANT CHANGE UP (ref 0–0.9)
LIDOCAIN IGE QN: 59 U/L — SIGNIFICANT CHANGE UP (ref 7–60)
LYMPHOCYTES # BLD AUTO: 2.38 K/UL — SIGNIFICANT CHANGE UP (ref 1–3.3)
LYMPHOCYTES NFR BLD AUTO: 32.3 % — SIGNIFICANT CHANGE UP (ref 13–44)
MAGNESIUM SERPL-MCNC: 2 MG/DL — SIGNIFICANT CHANGE UP (ref 1.6–2.6)
MCHC RBC-ENTMCNC: 29.2 PG — SIGNIFICANT CHANGE UP (ref 27–34)
MCHC RBC-ENTMCNC: 33.4 G/DL — SIGNIFICANT CHANGE UP (ref 32–36)
MCV RBC AUTO: 87.5 FL — SIGNIFICANT CHANGE UP (ref 80–100)
MONOCYTES # BLD AUTO: 0.48 K/UL — SIGNIFICANT CHANGE UP (ref 0–0.9)
MONOCYTES NFR BLD AUTO: 6.5 % — SIGNIFICANT CHANGE UP (ref 2–14)
NEUTROPHILS # BLD AUTO: 4.42 K/UL — SIGNIFICANT CHANGE UP (ref 1.8–7.4)
NEUTROPHILS NFR BLD AUTO: 60 % — SIGNIFICANT CHANGE UP (ref 43–77)
NRBC # BLD AUTO: 0 K/UL — SIGNIFICANT CHANGE UP (ref 0–0)
NRBC # FLD: 0 K/UL — SIGNIFICANT CHANGE UP (ref 0–0)
NRBC BLD AUTO-RTO: 0 /100 WBCS — SIGNIFICANT CHANGE UP (ref 0–0)
NT-PROBNP SERPL-SCNC: <36 PG/ML — SIGNIFICANT CHANGE UP (ref 0–300)
PLATELET # BLD AUTO: 191 K/UL — SIGNIFICANT CHANGE UP (ref 150–400)
PMV BLD: 11.7 FL — SIGNIFICANT CHANGE UP (ref 7–13)
POTASSIUM SERPL-MCNC: 3.9 MMOL/L — SIGNIFICANT CHANGE UP (ref 3.5–5.3)
POTASSIUM SERPL-SCNC: 3.9 MMOL/L — SIGNIFICANT CHANGE UP (ref 3.5–5.3)
PROT SERPL-MCNC: 7.5 G/DL — SIGNIFICANT CHANGE UP (ref 6–8.3)
RBC # BLD: 3.83 M/UL — SIGNIFICANT CHANGE UP (ref 3.8–5.2)
RBC # FLD: 11.8 % — SIGNIFICANT CHANGE UP (ref 10.3–14.5)
SODIUM SERPL-SCNC: 137 MMOL/L — SIGNIFICANT CHANGE UP (ref 135–145)
TROPONIN T, HIGH SENSITIVITY RESULT: <6 NG/L — SIGNIFICANT CHANGE UP (ref 0–51)
WBC # BLD: 7.37 K/UL — SIGNIFICANT CHANGE UP (ref 3.8–10.5)
WBC # FLD AUTO: 7.37 K/UL — SIGNIFICANT CHANGE UP (ref 3.8–10.5)

## 2025-07-24 PROCEDURE — 83690 ASSAY OF LIPASE: CPT

## 2025-07-24 PROCEDURE — 71046 X-RAY EXAM CHEST 2 VIEWS: CPT | Mod: 26

## 2025-07-24 PROCEDURE — 99284 EMERGENCY DEPT VISIT MOD MDM: CPT

## 2025-07-24 PROCEDURE — 93005 ELECTROCARDIOGRAM TRACING: CPT

## 2025-07-24 PROCEDURE — 80053 COMPREHEN METABOLIC PANEL: CPT

## 2025-07-24 PROCEDURE — 84702 CHORIONIC GONADOTROPIN TEST: CPT

## 2025-07-24 PROCEDURE — 83880 ASSAY OF NATRIURETIC PEPTIDE: CPT

## 2025-07-24 PROCEDURE — 71046 X-RAY EXAM CHEST 2 VIEWS: CPT

## 2025-07-24 PROCEDURE — 83735 ASSAY OF MAGNESIUM: CPT

## 2025-07-24 PROCEDURE — 85379 FIBRIN DEGRADATION QUANT: CPT

## 2025-07-24 PROCEDURE — 84484 ASSAY OF TROPONIN QUANT: CPT

## 2025-07-24 PROCEDURE — 99285 EMERGENCY DEPT VISIT HI MDM: CPT | Mod: 25

## 2025-07-24 PROCEDURE — 85025 COMPLETE CBC W/AUTO DIFF WBC: CPT

## 2025-07-24 PROCEDURE — 93010 ELECTROCARDIOGRAM REPORT: CPT

## 2025-07-24 NOTE — ED ADULT NURSE NOTE - OBJECTIVE STATEMENT
The pt is a 25 Y F with a PMH of ADHD and on BC oral use and a past family history of heart issues. pt is AOx4 breathing evenly on room air and able to speak in full sentneces. pt came in today co chest pain for over 10 days and that radiates to the  left shoulder and mid sternal pain. pt does sob head ache vision changes n/v/d/.c pt was placed on a cardiac monitor with comfort and safety measures provided. pt has comfort and safety measures provided

## 2025-07-24 NOTE — ED PROVIDER NOTE - PROGRESS NOTE DETAILS
Labs nonactionable, Trope was less than 6, D-dimer normal, chest x-ray reassuring.  Will discharge patient home with cardiology followed up.  Strict return indications discussed.

## 2025-07-24 NOTE — ED PROVIDER NOTE - CLINICAL SUMMARY MEDICAL DECISION MAKING FREE TEXT BOX
25-year-old female with no significant past medical history other than cholecystectomy, presenting to the emergency department with intermittent nonexertional nonpleuritic chest pain.  Will obtain basic chest pain workup including EKG, basic labs troponin, BNP, D-dimer as patient is on estrogen-containing birth control pills.  Anticipate she will be able to discharge home and will send with cardiology follow up if the above workup is negative.

## 2025-07-24 NOTE — ED PROVIDER NOTE - PHYSICAL EXAMINATION
Alert and oriented x 4, resting in bed comfortably, normal heart and lung sounds grossly to auscultation, no abdominal tenderness to palpation, no CVA tenderness, no lower extremity edema.

## 2025-07-24 NOTE — ED PROVIDER NOTE - PATIENT PORTAL LINK FT
You can access the FollowMyHealth Patient Portal offered by [facility] by registering at the following website: [email or web address]. By joining [employer]’s FollowMyHealth portal, you will also be able to view your health information using other applications (apps) compatible with our system.

## 2025-07-24 NOTE — ED PROVIDER NOTE - OBJECTIVE STATEMENT
25-year-old female with no significant past medical history presented to the urgency department with left-sided chest pain that is been ongoing now for the past 2 weeks, but more so in the past 2 days.  Notes that his left side behind the rib cage, is not exacerbated by movement, is nonexertional, she does not have any pleuritic component, and has no recent heavy workouts, trauma, rashes, fevers or chills, cough, shortness of breath.  Patient does take an estrogen containing birth control pill.  She has not had any recent flights, car rides, never had a blood clot in the past.  No family history of sudden cardiac disease but does have multiple family members with cardiac disease.

## 2025-07-24 NOTE — ED PROVIDER NOTE - NSCAREINITIATED _GEN_ER
Marie Gee(Resident) Sski Pregnancy And Lactation Text: This medication is Pregnancy Category D and isn't considered safe during pregnancy. It is excreted in breast milk.

## 2025-07-24 NOTE — ED ADULT NURSE NOTE - NS ED NURSE LEVEL OF CONSCIOUSNESS SPEECH
Attempted to ambulate patient at this time. Patient noted to have very unsteady gait and putting weight onto this nurse to try to take one step. Patient appeared to be falling asleep standing up. MD notified. Speaking Coherently

## 2025-07-24 NOTE — ED PROVIDER NOTE - ATTENDING CONTRIBUTION TO CARE
Attending MD Gibbs:  I performed a history and physical exam of the patient and discussed their management with the resident. I reviewed the resident's note and agree with the documented findings and plan of care. My medical decision making and observations are found above.    Attending MD Gibbs.  Agree with above.  Pt is a 24 yo fem with complaint of intermittent L sided non-exertional CP radiating to shoulder.  No TTP to palpation.  T wave inversions in V1-V2 unchanged.  New V3 T wave inversion.  Pt takes estrogen.  + family hx without sudden card death <50.  No other sig ACS risk factors identified.  Planned D-dimer, acs screening, cards referral for expedited outpt f/u.

## 2025-07-24 NOTE — ED ADULT TRIAGE NOTE - CHIEF COMPLAINT QUOTE
Midsternal chest pain and palpitations x yesterday however worsening in the past hours. "Feels like I have a rock on my chest and then I get achy pain." Endorses some L shoulder pain.   on oral contraceptive

## 2025-07-24 NOTE — ED PROVIDER NOTE - NSFOLLOWUPCLINICS_GEN_ALL_ED_FT
Cardiology at [facility]  Cardiology  [address] [place] [address]  Phone: [phone]  Fax:

## 2025-07-24 NOTE — ED PROVIDER NOTE - NSFOLLOWUPINSTRUCTIONS_ED_ALL_ED_FT
You were seen in the emergency department for chest pain.  You had testing to evaluate for a heart attack which was negative.  We also tested to see if you have a blood clot in your lungs with a blood test and this was negative as well.  It is unclear what is causing your symptoms at this time, but you can take Tylenol and ibuprofen at home to help with the symptoms.  We gave you a referral for cardiologist to see after you leave the hospital to see if they want to do any additional testing.    If you start to have severe chest pain, fevers or chills, severe shortness of breath or difficulty breathing, or other concerns and please return to the ED for evaluation.

## 2025-07-24 NOTE — ED PROVIDER NOTE - CHIEF COMPLAINT
2nd recall reminder letter mailed out to patient.     The patient is a 25y Female complaining of chest pain.